# Patient Record
Sex: FEMALE | Race: WHITE | NOT HISPANIC OR LATINO | Employment: FULL TIME | URBAN - METROPOLITAN AREA
[De-identification: names, ages, dates, MRNs, and addresses within clinical notes are randomized per-mention and may not be internally consistent; named-entity substitution may affect disease eponyms.]

---

## 2022-08-11 PROBLEM — N92.0 MENORRHAGIA WITH REGULAR CYCLE: Status: ACTIVE | Noted: 2022-08-11

## 2022-08-11 NOTE — PATIENT INSTRUCTIONS
- Maintain healthy weight with BMI ideally between 18-25     - Eat a healthy diet, including multiple servings of vegetables and fruits, as well as lean protein sources  - Get at least 150 minutes of moderate aerobic activity or 75 minutes of vigorous aerobic activity a week, or a combination of moderate and vigorous activity  Greater amounts of exercise will provide an even greater health benefit  - Ensure diet provides 1200mg of Calcium daily (divided) and 800IU of vitamin D, or take supplements to meet this  - Safe sex practices recommended  - Resources - information on birth control and sexually transmitted infections - www bedsider  org            - information on sexually transmitted infections - www cdc gov/std/

## 2022-08-12 ENCOUNTER — OFFICE VISIT (OUTPATIENT)
Dept: OBGYN CLINIC | Facility: CLINIC | Age: 37
End: 2022-08-12
Payer: COMMERCIAL

## 2022-08-12 VITALS
WEIGHT: 155.8 LBS | DIASTOLIC BLOOD PRESSURE: 80 MMHG | BODY MASS INDEX: 27.61 KG/M2 | SYSTOLIC BLOOD PRESSURE: 101 MMHG | HEIGHT: 63 IN

## 2022-08-12 DIAGNOSIS — Z01.419 ENCOUNTER FOR ANNUAL ROUTINE GYNECOLOGICAL EXAMINATION: Primary | ICD-10-CM

## 2022-08-12 DIAGNOSIS — N92.1 MENORRHAGIA WITH IRREGULAR CYCLE: ICD-10-CM

## 2022-08-12 DIAGNOSIS — Z12.4 CERVICAL CANCER SCREENING: ICD-10-CM

## 2022-08-12 PROCEDURE — 0503F POSTPARTUM CARE VISIT: CPT | Performed by: OBSTETRICS & GYNECOLOGY

## 2022-08-12 PROCEDURE — 99385 PREV VISIT NEW AGE 18-39: CPT | Performed by: OBSTETRICS & GYNECOLOGY

## 2022-08-12 RX ORDER — ALBUTEROL SULFATE 90 UG/1
2 AEROSOL, METERED RESPIRATORY (INHALATION) EVERY 4 HOURS PRN
COMMUNITY
Start: 2022-05-20

## 2022-08-12 RX ORDER — DEXTROAMPHETAMINE SACCHARATE, AMPHETAMINE ASPARTATE, DEXTROAMPHETAMINE SULFATE AND AMPHETAMINE SULFATE 5; 5; 5; 5 MG/1; MG/1; MG/1; MG/1
TABLET ORAL
COMMUNITY
Start: 2022-02-24

## 2022-08-12 NOTE — PROGRESS NOTES
Annual Wellness Visit  [address]   [address], [address], [place], [place]    ASSESSMENT/PLAN: Malachi Calderon is a 39 y o   who presents for annual gynecologic exam     Encounter for routine gynecologic examination  - Routine well woman exam completed today  - Cervical Cancer Screening: Current ASCCP Guidelines reviewed  Last Pap: 2018 normal  Next Pap Due: today, routine   - HPV Vaccination status: Not immunized  - STI screening offered including HIV testing: offered, pt declined  - Contraceptive counseling discussed  Current contraception: abstinence,   - The following were reviewed in today's visit: exercise and healthy diet    Additional problems addressed during this visit:  1  Encounter for annual routine gynecological examination    2  Cervical cancer screening  -     IGP, Aptima HPV, Rfx 16/18,45    3  Menorrhagia with irregular cycle  Assessment & Plan:  Periods long up to 10-12 days and irregular 23-38 days  [facility] is frustrated by length of bleeding  Discussed w/u in 2018 normal uterus, EMB benign  Discussed what is considered normal for periods and although some irregularity - I don't think her periods are pathologically abnormal     Discussed options include Lysteda (which would help heaviness) or po cyclic norethindrone (which would make lighter and more regular)  Offered progesterone IUD or Depo Provera - pt not interested in any injections or implants  Due to migraine with aura she is not a candidate for OCPs  She wishes to try cyclic norethindrone  She is aware this is not contraception  Will try 5mg daily for 21 days a month, can increase to 10mg if BTB or not helpful  Return in 3 months to review  Orders:  -     norethindrone (Aygestin) 5 mg tablet; Take 1 tablet (5 mg total) by mouth daily Take 21 days each month and restart on day 7 of period        Next visit: 1 year Wellness      CC:  Annual Gynecologic Examination    HPI: [facility] Grayce Hodgkin is a 39 y o   who presents for annual gynecologic examination  Elenita Pereira presents for Wellness exam and to discuss periods  She was last in our office in 2018  She denies any breast or urinary issues at today's visit  Pt states that she has bad periods  Pt states that she starts with light spotting for 2-5 days, then she has heavy bleeding 2-3 days, then she will have spotting for another 2-3 days after the heavy bleeding  Pt reports long periods  Pt states that cramping and pain is normal, not significantly worse than normal   About every 23-38 days  Has not been sexually active for years  Gyn History  Patient's last menstrual period was 2022 (exact date)  Last Pap: 2018 was normal    She  reports previously being sexually active and has had partner(s) who are male  She reports using the following method of birth control/protection: Abstinence         OB History      Past Medical History:  No date: ADHD  No date: Asthma  No date: Migraine      Comment:  Migraines w/ aura occasionally     Past Surgical History:  No date:  SECTION      Comment:   and   No date: MYRINGOTOMY  No date: TONSILLECTOMY AND ADENOIDECTOMY     Family History   Problem Relation Age of Onset    Osteoporosis Mother     Rashes / Skin problems Mother         Rosacea    Migraines Father     Rashes / Skin problems Brother         Rosacea    Breast cancer Maternal Grandmother         Maternal grandmother, maternal great grandmother - both postmenopausal    Osteoarthritis Maternal Grandmother     Osteoporosis Maternal Grandmother     Colon cancer Maternal Grandfather     Hypertension Maternal Grandfather     Lung disease Maternal Grandfather         COPD, Emphysema    Migraines Maternal Grandfather     Lung cancer Paternal Grandmother     Bone cancer Paternal Grandfather     Asthma Son     Rashes / Skin problems Son         Eczema    Breast cancer Maternal Aunt postmenopausal        Social History     Tobacco Use    Smoking status: Never Smoker    Smokeless tobacco: Never Used   Vaping Use    Vaping Use: Never used   Substance Use Topics    Alcohol use: Not Currently     Comment: very rarely    Drug use: Never          Current Outpatient Medications:     albuterol (PROVENTIL HFA,VENTOLIN HFA) 90 mcg/act inhaler, Inhale 2 puffs every 4 (four) hours as needed, Disp: , Rfl:     amphetamine-dextroamphetamine (ADDERALL) 20 mg tablet, , Disp: , Rfl:     norethindrone (Aygestin) 5 mg tablet, Take 1 tablet (5 mg total) by mouth daily Take 21 days each month and restart on day 7 of period  , Disp: 30 tablet, Rfl: 3    She is allergic to erythromycin and latex  Leisa Burow ROS negative except as noted in HPI    Objective:  /80 (BP Location: Left arm, Patient Position: Sitting, Cuff Size: Standard)   Ht 5' 3" (1 6 m)   Wt 70 7 kg (155 lb 12 8 oz)   LMP 07/28/2022 (Exact Date)   BMI 27 60 kg/m²      Physical Exam  Constitutional:       Appearance: Normal appearance  Chest:   Breasts:      Right: Normal  No mass, tenderness or axillary adenopathy  Left: Normal  No mass, tenderness or axillary adenopathy  Abdominal:      Palpations: Abdomen is soft  Tenderness: There is no abdominal tenderness  Genitourinary:     General: Normal vulva  Vagina: No bleeding or lesions  Cervix: Normal       Uterus: Normal  Not tender  Adnexa:         Right: No mass or tenderness  Left: No mass or tenderness  Rectum: No external hemorrhoid  Musculoskeletal:         General: Normal range of motion  Lymphadenopathy:      Upper Body:      Right upper body: No axillary adenopathy  Left upper body: No axillary adenopathy  Neurological:      Mental Status: She is alert and oriented to person, place, and time     Psychiatric:         Mood and Affect: Mood normal          Behavior: Behavior normal

## 2022-08-12 NOTE — ASSESSMENT & PLAN NOTE
Periods long up to 10-12 days and irregular 23-38 days  Kristin Olvera is frustrated by length of bleeding  Discussed w/u in 2018 normal uterus, EMB benign  Discussed what is considered normal for periods and although some irregularity - I don't think her periods are pathologically abnormal     Discussed options include Lysteda (which would help heaviness) or po cyclic norethindrone (which would make lighter and more regular)  Offered progesterone IUD or Depo Provera - pt not interested in any injections or implants  Due to migraine with aura she is not a candidate for OCPs  She wishes to try cyclic norethindrone  She is aware this is not contraception  Will try 5mg daily for 21 days a month, can increase to 10mg if BTB or not helpful  Return in 3 months to review

## 2022-08-17 LAB
CYTOLOGIST CVX/VAG CYTO: NORMAL
DX ICD CODE: NORMAL
HPV I/H RISK 4 DNA CVX QL PROBE+SIG AMP: NEGATIVE
OTHER STN SPEC: NORMAL
PATH REPORT.FINAL DX SPEC: NORMAL
SL AMB NOTE:: NORMAL
SL AMB SPECIMEN ADEQUACY: NORMAL
SL AMB TEST METHODOLOGY: NORMAL

## 2022-11-03 NOTE — PROGRESS NOTES
[address]   [address], [address], [facility], [place]    Assessment/Plan:  Blanca Wagner is a 40y o  year old  who presents for follow up medication  1  Menorrhagia with irregular cycle  Assessment & Plan:  Blanca Wagner is happier with her bleeding since starting 5mg aygestin for 21 days each month because it is overall lighter than natural periods  But she is having light bleeding twice a month  Suggested increasing to 10mg for 21 days and see if can decrease the BTB while on pills  She agrees  Gave her paper calendar to keep track if she wants  Gave 12 months - she will call or return if issues  If bleeding continues very irregularly we could repeat pelvic u/s to r/o other causes, given last one was   She agrees  She is aware it is not contraceptoin  Orders:  -     norethindrone (Aygestin) 5 mg tablet; Take 2 tablets (10 mg total) by mouth daily Take 21 days each month and restart on day 7 of period  Subjective:     CC: having break through bleeding    HPI: Viviann Skiff is a 40 y o  who presents for medication follow up     2022 WA pt c/o long periods 10-12 days and some irregularity (q 23-28 days)  Frustrated by length of bleeding  Discussed options  Not sexually active  She wishes to try cyclic norethindrone  She is aware this is not contraception  Will try 5mg daily for 21 days a month, can increase to 10mg if BTB or not helpful  Returns today to follow up  States "I'm having break through bleeding you talked about"  But overall bleeding better than before  Started medication after next period in August - took 5mg daily for 21 days (as prescribed)  - had 5 days light spotting about 1 week after starting that stopped on it's own and then 6 days light spotting when stopped Aygestin  Restarted day 7 of period the same and similar pattern        I tried to get specific dates of taking and bleeding with Blanca Wagner, but she had trouble figuring out exactly by looking at her phone  Generally spotting 2x/ month for 5-6 days - very light only needing mini pad  Overall she is happier with this than prior because it is much less bleeding, although 2x/month  No side effects  The following portions of the patient's history were reviewed and updated as appropriate: allergies, current medications, past family history, past medical history, obstetric history, gynecologic history, past social history, past surgical history and problem list     ROS: Review of Systems   Constitutional: Negative  Gastrointestinal: Negative  Genitourinary: Positive for vaginal bleeding (irregular bleeding lightly)  Negative for pelvic pain and vaginal pain  Psychiatric/Behavioral: Negative  Current Outpatient Medications:   •  albuterol (PROVENTIL HFA,VENTOLIN HFA) 90 mcg/act inhaler, Inhale 2 puffs every 4 (four) hours as needed, Disp: , Rfl:   •  amphetamine-dextroamphetamine (ADDERALL) 20 mg tablet, , Disp: , Rfl:   •  amphetamine-dextroamphetamine (ADDERALL) 7 5 MG tablet, TAKE 1 TABLET BY MOUTH DAILY IN THE AFTERNOON IF NEEDED, Disp: , Rfl:   •  norethindrone (Aygestin) 5 mg tablet, Take 2 tablets (10 mg total) by mouth daily Take 21 days each month and restart on day 7 of period  , Disp: 42 tablet, Rfl: 12    Objective:  /70 (BP Location: Left arm, Patient Position: Sitting, Cuff Size: Standard)   Ht 5' 2 5" (1 588 m)   Wt 70 6 kg (155 lb 9 6 oz)   LMP 10/29/2022 (Exact Date)   Breastfeeding No   BMI 28 01 kg/m²      Physical Exam  Constitutional:       Appearance: Normal appearance  Neurological:      Mental Status: She is alert and oriented to person, place, and time     Psychiatric:         Behavior: Behavior normal

## 2022-11-04 ENCOUNTER — OFFICE VISIT (OUTPATIENT)
Dept: OBGYN CLINIC | Facility: CLINIC | Age: 37
End: 2022-11-04

## 2022-11-04 VITALS
HEIGHT: 63 IN | BODY MASS INDEX: 27.57 KG/M2 | WEIGHT: 155.6 LBS | DIASTOLIC BLOOD PRESSURE: 70 MMHG | SYSTOLIC BLOOD PRESSURE: 108 MMHG

## 2022-11-04 DIAGNOSIS — N92.1 MENORRHAGIA WITH IRREGULAR CYCLE: ICD-10-CM

## 2022-11-04 RX ORDER — DEXTROAMPHETAMINE SACCHARATE, AMPHETAMINE ASPARTATE, DEXTROAMPHETAMINE SULFATE AND AMPHETAMINE SULFATE 1.875; 1.875; 1.875; 1.875 MG/1; MG/1; MG/1; MG/1
TABLET ORAL
COMMUNITY
Start: 2022-10-17

## 2022-11-04 NOTE — ASSESSMENT & PLAN NOTE
Nick Reyes is happier with her bleeding since starting 5mg aygestin for 21 days each month because it is overall lighter than natural periods  But she is having light bleeding twice a month  Suggested increasing to 10mg for 21 days and see if can decrease the BTB while on pills  She agrees  Gave her paper calendar to keep track if she wants  Gave 12 months - she will call or return if issues  If bleeding continues very irregularly we could repeat pelvic u/s to r/o other causes, given last one was 2018  She agrees  She is aware it is not contraceptoin

## 2023-10-25 DIAGNOSIS — N92.1 MENORRHAGIA WITH IRREGULAR CYCLE: ICD-10-CM

## 2024-01-17 DIAGNOSIS — N92.1 MENORRHAGIA WITH IRREGULAR CYCLE: ICD-10-CM

## 2024-02-01 NOTE — PATIENT INSTRUCTIONS
- Maintain healthy weight with BMI ideally between 18-25.    - Eat a healthy diet, including multiple servings of vegetables and fruits, as well as lean protein sources.  - Get at least 150 minutes of moderate aerobic activity or 75 minutes of vigorous aerobic activity a week, or a combination of moderate and vigorous activity.  Greater amounts of exercise will provide an even greater health benefit.   - Ensure diet provides 1200mg of Calcium daily (divided) and 800IU of vitamin D, or take supplements to meet this.  - Safe sex practices recommended.    - Resources - information on birth control and sexually transmitted infections - www.bedsider.org            - information on sexually transmitted infections - www.cdc.gov/std/     Kegel Exercises for Women   AMBULATORY CARE:   Kegel exercises  help strengthen your pelvic muscles. Pelvic muscles hold your pelvic organs, such as your bladder and uterus, in place. Kegel exercises help prevent or control certain conditions, such as urine incontinence (leakage) or uterine prolapse.       Call your doctor or physical therapist if:   You cannot feel your muscles tighten or relax.    You continue to leak urine.    You have questions or concerns about your condition or care.    Use the correct muscles:  Pelvic muscles are the muscles you use to control urine flow. To target these muscles, stop and start the flow of urine several times. This will help you become familiar with how it feels to tighten and relax these muscles.  How to do Kegel exercises:   Get into a comfortable position.  You may lie down, stand up, or sit down to do these exercises. When you first try to do these exercises, it may be easier if you lie down.    Tighten or squeeze your pelvic muscles slowly.  It may feel like you are trying to hold back urine or gas. Hold this position for 3 seconds. Relax for 3 seconds. Repeat this cycle 10 times. Do not hold your breath when you do Kegel exercises. Keep your  stomach, back, and leg muscles relaxed.    Do 10 sets of Kegel exercises, at least 3 times a day.  When you know how to do Kegel exercises, use different positions. This will help to strengthen your pelvic muscles as much as possible. You can do these exercises while you lie on the floor, watch TV, or while you stand. Tighten your pelvic muscles before you sneeze, cough, or lift to prevent urine leakage. You may notice improved bladder control within about 6 weeks.    Follow up with your doctor or physical therapist as directed:  Write down your questions so you remember to ask them during your visits.  © Copyright Mer[employer] 2023 Information is for End User's use only and may not be sold, redistributed or otherwise used for commercial purposes.  The above information is an  only. It is not intended as medical advice for individual conditions or treatments. Talk to your doctor, nurse or pharmacist before following any medical regimen to see if it is safe and effective for you.

## 2024-02-02 ENCOUNTER — ANNUAL EXAM (OUTPATIENT)
Dept: OBGYN CLINIC | Facility: CLINIC | Age: 39
End: 2024-02-02
Payer: COMMERCIAL

## 2024-02-02 VITALS
DIASTOLIC BLOOD PRESSURE: 70 MMHG | SYSTOLIC BLOOD PRESSURE: 102 MMHG | BODY MASS INDEX: 30.37 KG/M2 | HEIGHT: 63 IN | WEIGHT: 171.4 LBS

## 2024-02-02 DIAGNOSIS — Z01.419 ENCOUNTER FOR ANNUAL ROUTINE GYNECOLOGICAL EXAMINATION: Primary | ICD-10-CM

## 2024-02-02 DIAGNOSIS — N39.3 SUI (STRESS URINARY INCONTINENCE, FEMALE): ICD-10-CM

## 2024-02-02 DIAGNOSIS — N92.1 MENORRHAGIA WITH IRREGULAR CYCLE: ICD-10-CM

## 2024-02-02 PROCEDURE — 99395 PREV VISIT EST AGE 18-39: CPT | Performed by: OBSTETRICS & GYNECOLOGY

## 2024-02-02 RX ORDER — MULTIVITAMIN
1 TABLET ORAL DAILY
COMMUNITY

## 2024-02-02 NOTE — ASSESSMENT & PLAN NOTE
Discussed causes of stress unrinary incontinence w/ patient.  Reviewed weight loss, Kegel and core exercises to increase strength of pelvic floor, as well as frequent voiding or decreasing fluid intake to decrease risk of leaking.  Reviewed options of physical therapy and surgical correction.  Gave handout with AVS on Kegel exercises.

## 2024-02-02 NOTE — ASSESSMENT & PLAN NOTE
Taking monthly for 21 days with light monthly withdrawal bleeds.  Not sexually active.  Very happy.  To continue.  Patient reminded this is NOT contraception.

## 2024-02-02 NOTE — LETTER
2024     YASMINE Brown  [address]  [address]  [place] [address]    Patient: Betty Cherry   YOB: 1985   Date of Visit: 2024       Dear Dr. Childs:    Thank you for referring Betty Cherry to me for evaluation. Below are my notes for this consultation.    If you have questions, please do not hesitate to call me. I look forward to following your patient along with you.         Sincerely,        Roberta Gutierrez MD        CC: No Recipients    Roberta Gutierrez MD  2024  8:41 AM  Sign when Signing Visit  Annual Wellness Visit  [facility] - [address], [address], [place] [address]    ASSESSMENT/PLAN: Betty Cherry is a 38 y.o.  who presents for annual gynecologic exam.    Encounter for routine gynecologic examination  - Routine well woman exam completed today.  - Cervical Cancer Screening: Current ASCCP Guidelines reviewed. Last Pap: 2022 normal. Next Pap Due: 2 years, routine.  - HPV Vaccination status: Not immunized  - STI screening offered including HIV testing: offered, pt declined  - Contraceptive counseling discussed.  Current contraception: abstinence,   - The following were reviewed in today's visit: family planning choices, exercise, and healthy diet    Additional problems addressed during this visit:  1. Encounter for annual routine gynecological examination    2. Menorrhagia with irregular cycle  Assessment & Plan:  Taking monthly for 21 days with light monthly withdrawal bleeds.  Not sexually active.  Very happy.  To continue.  Patient reminded this is NOT contraception.    Orders:  -     norethindrone (AYGESTIN) 5 mg tablet; Take 10 mg daily for 21 days, then stop for 7 days.  Restart.    3. GREGORY (stress urinary incontinence, female)  Assessment & Plan:  Discussed causes of stress unrinary incontinence w/ patient.  Reviewed weight loss, Kegel and core exercises to increase strength of pelvic floor, as  well as frequent voiding or decreasing fluid intake to decrease risk of leaking.  Reviewed options of physical therapy and surgical correction.  Gave handout with AVS on Kegel exercises.          Next visit: 1 year Wellness      CC:  Annual Gynecologic Examination    HPI: Betty Cherry is a 38 y.o.  who presents for annual gynecologic examination.  She denies any breast, urinary or pelvic issues at today's visit.    Taking Aygestin to regulate and make lighter. Takes 10mg AM daily for 21 days, stops 7 days.    Getting mostly monthly periods at day or two after stopping Aygestin.  Occasionally skips one and notes it's at high stress times.  Not sexually active.    GREGORY noted with cough and sneeze        Gyn History  Patient's last menstrual period was 2024 (exact date).     Last Pap: 2022 was normal    She  reports that she is not currently sexually active and has had partner(s) who are male. She reports using the following method of birth control/protection: Abstinence.       OB History      Past Medical History:  No date: ADHD  No date: Asthma  No date: Migraine      Comment:  Migraines w/ aura occasionally  No date: GREGORY (stress urinary incontinence, female)      Comment:  mild - noted      Past Surgical History:  No date:  SECTION      Comment:   and 2023: DENTAL IMPLANT  No date: MYRINGOTOMY  No date: TONSILLECTOMY AND ADENOIDECTOMY     Family History   Problem Relation Age of Onset   • Osteoporosis Mother    • Rashes / Skin problems Mother         Rosacea   • Migraines Father    • Rashes / Skin problems Brother         Rosacea   • Asthma Son    • Rashes / Skin problems Son         Eczema   • Breast cancer Maternal Grandmother         Maternal grandmother, maternal great grandmother - both postmenopausal   • Osteoarthritis Maternal Grandmother    • Osteoporosis Maternal Grandmother    • Colon cancer Maternal Grandfather    • Hypertension Maternal Grandfather    •  "Lung disease Maternal Grandfather         COPD, Emphysema   • Migraines Maternal Grandfather    • Lung cancer Paternal Grandmother    • Bone cancer Paternal Grandfather    • Breast cancer Maternal Aunt         postmenopausal   • Breast cancer Cousin         Social History     Tobacco Use   • Smoking status: Never   • Smokeless tobacco: Never   Vaping Use   • Vaping status: Never Used   Substance Use Topics   • Alcohol use: Not Currently     Comment: very rarely   • Drug use: Never          Current Outpatient Medications:   •  albuterol (PROVENTIL HFA,VENTOLIN HFA) 90 mcg/act inhaler, Inhale 2 puffs every 4 (four) hours as needed, Disp: , Rfl:   •  amphetamine-dextroamphetamine (ADDERALL) 20 mg tablet, , Disp: , Rfl:   •  amphetamine-dextroamphetamine (ADDERALL) 7.5 MG tablet, TAKE 1 TABLET BY MOUTH DAILY IN THE AFTERNOON IF NEEDED, Disp: , Rfl:   •  Multiple Vitamin (multivitamin) tablet, Take 1 tablet by mouth daily, Disp: , Rfl:   •  norethindrone (AYGESTIN) 5 mg tablet, Take 10 mg daily for 21 days, then stop for 7 days.  Restart., Disp: 42 tablet, Rfl: 12    She is allergic to erythromycin and latex..    ROS negative except as noted in HPI    Objective:  /70   Ht 5' 2.5\" (1.588 m)   Wt 77.7 kg (171 lb 6.4 oz)   LMP 01/05/2024 (Exact Date)   Breastfeeding No   BMI 30.85 kg/m²      Physical Exam  Constitutional:       Appearance: Normal appearance.   Chest:   Breasts:     Right: Normal. No mass or tenderness.      Left: Normal. No mass or tenderness.   Abdominal:      Palpations: Abdomen is soft.      Tenderness: There is no abdominal tenderness.   Genitourinary:     General: Normal vulva.      Vagina: No bleeding or lesions.      Cervix: Normal.      Uterus: Normal. Not tender.       Adnexa:         Right: No mass or tenderness.          Left: No mass or tenderness.        Rectum: No external hemorrhoid.   Musculoskeletal:         General: Normal range of motion.   Lymphadenopathy:      Upper Body:     "  Right upper body: No axillary adenopathy.      Left upper body: No axillary adenopathy.   Neurological:      Mental Status: She is alert and oriented to person, place, and time.   Psychiatric:         Mood and Affect: Mood normal.         Behavior: Behavior normal.

## 2024-02-02 NOTE — PROGRESS NOTES
Annual Wellness Visit  [facility] - [address], [address], [place] [address]    ASSESSMENT/PLAN: Betty Cherry is a 38 y.o.  who presents for annual gynecologic exam.    Encounter for routine gynecologic examination  - Routine well woman exam completed today.  - Cervical Cancer Screening: Current ASCCP Guidelines reviewed. Last Pap: 2022 normal. Next Pap Due: 2 years, routine.  - HPV Vaccination status: Not immunized  - STI screening offered including HIV testing: offered, pt declined  - Contraceptive counseling discussed.  Current contraception: abstinence,   - The following were reviewed in today's visit: family planning choices, exercise, and healthy diet    Additional problems addressed during this visit:  1. Encounter for annual routine gynecological examination    2. Menorrhagia with irregular cycle  Assessment & Plan:  Taking monthly for 21 days with light monthly withdrawal bleeds.  Not sexually active.  Very happy.  To continue.  Patient reminded this is NOT contraception.    Orders:  -     norethindrone (AYGESTIN) 5 mg tablet; Take 10 mg daily for 21 days, then stop for 7 days.  Restart.    3. GREGORY (stress urinary incontinence, female)  Assessment & Plan:  Discussed causes of stress unrinary incontinence w/ patient.  Reviewed weight loss, Kegel and core exercises to increase strength of pelvic floor, as well as frequent voiding or decreasing fluid intake to decrease risk of leaking.  Reviewed options of physical therapy and surgical correction.  Gave handout with AVS on Kegel exercises.          Next visit: 1 year Wellness      CC:  Annual Gynecologic Examination    HPI: Betty Cherry is a 38 y.o.  who presents for annual gynecologic examination.  She denies any breast, urinary or pelvic issues at today's visit.    Taking Aygestin to regulate and make lighter. Takes 10mg AM daily for 21 days, stops 7 days.    Getting mostly monthly periods at day or two  after stopping Aygestin.  Occasionally skips one and notes it's at high stress times.  Not sexually active.    GREGORY noted with cough and sneeze        Gyn History  Patient's last menstrual period was 2024 (exact date).     Last Pap: 2022 was normal    She  reports that she is not currently sexually active and has had partner(s) who are male. She reports using the following method of birth control/protection: Abstinence.       OB History      Past Medical History:  No date: ADHD  No date: Asthma  No date: Migraine      Comment:  Migraines w/ aura occasionally  No date: GREGORY (stress urinary incontinence, female)      Comment:  mild - noted      Past Surgical History:  No date:  SECTION      Comment:   and 2023: DENTAL IMPLANT  No date: MYRINGOTOMY  No date: TONSILLECTOMY AND ADENOIDECTOMY     Family History   Problem Relation Age of Onset    Osteoporosis Mother     Rashes / Skin problems Mother         Rosacea    Migraines Father     Rashes / Skin problems Brother         Rosacea    Asthma Son     Rashes / Skin problems Son         Eczema    Breast cancer Maternal Grandmother         Maternal grandmother, maternal great grandmother - both postmenopausal    Osteoarthritis Maternal Grandmother     Osteoporosis Maternal Grandmother     Colon cancer Maternal Grandfather     Hypertension Maternal Grandfather     Lung disease Maternal Grandfather         COPD, Emphysema    Migraines Maternal Grandfather     Lung cancer Paternal Grandmother     Bone cancer Paternal Grandfather     Breast cancer Maternal Aunt         postmenopausal    Breast cancer Cousin         Social History     Tobacco Use    Smoking status: Never    Smokeless tobacco: Never   Vaping Use    Vaping status: Never Used   Substance Use Topics    Alcohol use: Not Currently     Comment: very rarely    Drug use: Never          Current Outpatient Medications:     albuterol (PROVENTIL HFA,VENTOLIN HFA) 90 mcg/act inhaler,  "Inhale 2 puffs every 4 (four) hours as needed, Disp: , Rfl:     amphetamine-dextroamphetamine (ADDERALL) 20 mg tablet, , Disp: , Rfl:     amphetamine-dextroamphetamine (ADDERALL) 7.5 MG tablet, TAKE 1 TABLET BY MOUTH DAILY IN THE AFTERNOON IF NEEDED, Disp: , Rfl:     Multiple Vitamin (multivitamin) tablet, Take 1 tablet by mouth daily, Disp: , Rfl:     norethindrone (AYGESTIN) 5 mg tablet, Take 10 mg daily for 21 days, then stop for 7 days.  Restart., Disp: 42 tablet, Rfl: 12    She is allergic to erythromycin and latex..    ROS negative except as noted in HPI    Objective:  /70   Ht 5' 2.5\" (1.588 m)   Wt 77.7 kg (171 lb 6.4 oz)   LMP 01/05/2024 (Exact Date)   Breastfeeding No   BMI 30.85 kg/m²      Physical Exam  Constitutional:       Appearance: Normal appearance.   Chest:   Breasts:     Right: Normal. No mass or tenderness.      Left: Normal. No mass or tenderness.   Abdominal:      Palpations: Abdomen is soft.      Tenderness: There is no abdominal tenderness.   Genitourinary:     General: Normal vulva.      Vagina: No bleeding or lesions.      Cervix: Normal.      Uterus: Normal. Not tender.       Adnexa:         Right: No mass or tenderness.          Left: No mass or tenderness.        Rectum: No external hemorrhoid.   Musculoskeletal:         General: Normal range of motion.   Lymphadenopathy:      Upper Body:      Right upper body: No axillary adenopathy.      Left upper body: No axillary adenopathy.   Neurological:      Mental Status: She is alert and oriented to person, place, and time.   Psychiatric:         Mood and Affect: Mood normal.         Behavior: Behavior normal.         "

## 2025-02-15 DIAGNOSIS — N92.1 MENORRHAGIA WITH IRREGULAR CYCLE: ICD-10-CM

## 2025-02-18 RX ORDER — NORETHINDRONE 5 MG/1
TABLET ORAL
Qty: 42 TABLET | Refills: 1 | Status: SHIPPED | OUTPATIENT
Start: 2025-02-18

## 2025-03-06 ENCOUNTER — ANNUAL EXAM (OUTPATIENT)
Dept: OBGYN CLINIC | Facility: CLINIC | Age: 40
End: 2025-03-06
Payer: COMMERCIAL

## 2025-03-06 VITALS
HEIGHT: 63 IN | BODY MASS INDEX: 32.96 KG/M2 | DIASTOLIC BLOOD PRESSURE: 76 MMHG | SYSTOLIC BLOOD PRESSURE: 118 MMHG | WEIGHT: 186 LBS

## 2025-03-06 DIAGNOSIS — N92.1 MENORRHAGIA WITH IRREGULAR CYCLE: ICD-10-CM

## 2025-03-06 DIAGNOSIS — Z12.31 ENCOUNTER FOR SCREENING MAMMOGRAM FOR MALIGNANT NEOPLASM OF BREAST: ICD-10-CM

## 2025-03-06 DIAGNOSIS — Z01.419 ENCOUNTER FOR ANNUAL ROUTINE GYNECOLOGICAL EXAMINATION: Primary | ICD-10-CM

## 2025-03-06 PROCEDURE — 99395 PREV VISIT EST AGE 18-39: CPT | Performed by: OBSTETRICS & GYNECOLOGY

## 2025-03-06 RX ORDER — NORETHINDRONE 5 MG/1
TABLET ORAL
Qty: 42 TABLET | Refills: 13 | Status: SHIPPED | OUTPATIENT
Start: 2025-03-06

## 2025-03-06 NOTE — PROGRESS NOTES
Annual Wellness Visit  [facility] - [address], [address], [place] [address]    ASSESSMENT/PLAN: Betty Cherry is a 39 y.o.  who presents for annual gynecologic exam.    Assessment & Plan  Encounter for annual routine gynecological examination  - Routine well woman exam completed today.  - Cervical Cancer Screening: Current ASCCP Guidelines reviewed. Last Pap: 2022 normal.  Past abnormal pap: none.  Next Pap Due: 1-2 years.  - HPV Vaccination status: Not immunized  - STI screening offered including HIV testing: offered, pt declined  - Contraceptive counseling discussed.  Current contraception: abstinence,   - The following were reviewed in today's visit: mammography screening ordered, exercise, and healthy diet       Encounter for screening mammogram for malignant neoplasm of breast  Start at age 39yo.  Orders:    Mammo screening bilateral w 3d and cad; Future    Menorrhagia with irregular cycle  Cyclic Aygestin effective in controlling heavy and irregular periods.  Continue at 10mg daily for 21 days each month.  Refill.  Reminded this is NOT contraception.    Orders:    norethindrone (AYGESTIN) 5 mg tablet; TAKE 2 TABLETS BY MOUTH EVERY DAY FOR 21 DAYS , THEN STOP FOR 7 DAYS. RESTART.      Next visit: 1 year Wellness      CC:  Annual Gynecologic Examination    HPI: Betty Cehrry is a 39 y.o.  who presents for annual gynecologic examination.  She denies any breast, urinary or pelvic issues at today's visit.    Monthly bleeding - 1-2 days after stopping Aygestin.  Taking Aygestin to regulate and make periods lighter. Takes 10mg AM daily for 21 days, stops 7 days.    Occasionally skips withdrawal bleed but notes it's at high stress times.  Not sexually active.          Gyn History  Patient's last menstrual period was 2025 (exact date).     Last Pap: 2022 was normal    She  reports that she is not currently sexually active and has had partner(s) who are  male. She reports using the following method of birth control/protection: Abstinence.       OB History      Past Medical History:  No date: ADHD  No date: Asthma  No date: Migraine      Comment:  Migraines w/ aura occasionally  No date: GREGORY (stress urinary incontinence, female)      Comment:  mild - noted   No date: Varicella      Comment:  As a child     Past Surgical History:  No date:  SECTION      Comment:   and 2023: DENTAL IMPLANT  No date: MYRINGOTOMY  No date: TONSILLECTOMY AND ADENOIDECTOMY     Family History   Problem Relation Age of Onset    Osteoporosis Mother     Rashes / Skin problems Mother         Rosacea    Migraines Father     Rashes / Skin problems Brother         Rosacea    Asthma Son     Rashes / Skin problems Son         Eczema    Breast cancer Maternal Grandmother         Maternal grandmother, maternal great grandmother, maternal aunt    Osteoarthritis Maternal Grandmother     Osteoporosis Maternal Grandmother     Colon cancer Maternal Grandfather     Hypertension Maternal Grandfather     Lung disease Maternal Grandfather         COPD, Emphysema    Migraines Maternal Grandfather     Lung cancer Paternal Grandmother     Cancer Paternal Grandmother         Lung cancer    Bone cancer Paternal Grandfather     Cancer Paternal Grandfather         Bone cancer    Breast cancer Maternal Aunt         postmenopausal    Breast cancer Cousin     Asthma Son     Rashes / Skin problems Son         Eczema    Rashes / Skin problems Brother         Rosacea        Social History     Tobacco Use    Smoking status: Never    Smokeless tobacco: Never   Vaping Use    Vaping status: Never Used   Substance Use Topics    Alcohol use: Not Currently     Comment: 1x/year    Drug use: Never          Current Outpatient Medications:     albuterol (PROVENTIL HFA,VENTOLIN HFA) 90 mcg/act inhaler, Inhale 2 puffs every 4 (four) hours as needed, Disp: , Rfl:     amphetamine-dextroamphetamine (ADDERALL)  "20 mg tablet, , Disp: , Rfl:     Multiple Vitamin (multivitamin) tablet, Take 1 tablet by mouth daily, Disp: , Rfl:     norethindrone (AYGESTIN) 5 mg tablet, TAKE 2 TABLETS BY MOUTH EVERY DAY FOR 21 DAYS , THEN STOP FOR 7 DAYS. RESTART., Disp: 42 tablet, Rfl: 13    She is allergic to erythromycin and latex..    ROS negative except as noted in HPI    Objective:  /76 (BP Location: Left arm, Patient Position: Sitting, Cuff Size: Standard)   Ht 5' 2.5\" (1.588 m)   Wt 84.4 kg (186 lb)   LMP 02/25/2025 (Exact Date)   BMI 33.48 kg/m²      Physical Exam  Constitutional:       Appearance: Normal appearance.   Chest:   Breasts:     Right: Normal. No mass or tenderness.      Left: Normal. No mass or tenderness.   Abdominal:      Palpations: Abdomen is soft.      Tenderness: There is no abdominal tenderness.   Genitourinary:     General: Normal vulva.      Vagina: No bleeding or lesions.      Cervix: Normal.      Uterus: Normal. Not tender.       Adnexa:         Right: No mass or tenderness.          Left: No mass or tenderness.        Rectum: No external hemorrhoid.   Musculoskeletal:         General: Normal range of motion.   Lymphadenopathy:      Upper Body:      Right upper body: No axillary adenopathy.      Left upper body: No axillary adenopathy.   Neurological:      Mental Status: She is alert and oriented to person, place, and time.   Psychiatric:         Mood and Affect: Mood normal.         Behavior: Behavior normal.         "

## 2025-03-06 NOTE — ASSESSMENT & PLAN NOTE
Cyclic Aygestin effective in controlling heavy and irregular periods.  Continue at 10mg daily for 21 days each month.  Refill.  Reminded this is NOT contraception.    Orders:    norethindrone (AYGESTIN) 5 mg tablet; TAKE 2 TABLETS BY MOUTH EVERY DAY FOR 21 DAYS , THEN STOP FOR 7 DAYS. RESTART.

## 2025-08-07 DIAGNOSIS — F90.9 ATTENTION DEFICIT HYPERACTIVITY DISORDER (ADHD), UNSPECIFIED ADHD TYPE: Primary | ICD-10-CM

## 2025-08-07 RX ORDER — DEXTROAMPHETAMINE SACCHARATE, AMPHETAMINE ASPARTATE, DEXTROAMPHETAMINE SULFATE AND AMPHETAMINE SULFATE 2.5; 2.5; 2.5; 2.5 MG/1; MG/1; MG/1; MG/1
10 TABLET ORAL DAILY PRN
Qty: 30 TABLET | Refills: 0 | Status: SHIPPED | OUTPATIENT
Start: 2025-08-07

## 2025-08-12 PROBLEM — F90.0 ATTENTION DEFICIT HYPERACTIVITY DISORDER, PREDOMINANTLY INATTENTIVE TYPE: Status: ACTIVE | Noted: 2025-08-12

## 2025-08-12 PROBLEM — K58.2 MIXED IRRITABLE BOWEL SYNDROME: Status: ACTIVE | Noted: 2025-08-12

## 2025-08-12 PROBLEM — F41.9 ANXIETY DISORDER, UNSPECIFIED: Status: ACTIVE | Noted: 2025-08-12

## 2025-08-12 PROBLEM — F33.42 MAJOR DEPRESSIVE DISORDER, RECURRENT, IN FULL REMISSION (HCC): Status: ACTIVE | Noted: 2025-08-12

## 2025-08-12 PROBLEM — J45.20 MILD INTERMITTENT ASTHMA, UNCOMPLICATED: Status: ACTIVE | Noted: 2025-08-12

## 2025-08-12 PROBLEM — K08.9 DISORDER OF TEETH AND SUPPORTING STRUCTURES, UNSPECIFIED: Status: ACTIVE | Noted: 2025-08-12

## 2025-08-12 PROBLEM — G43.109 MIGRAINE WITH AURA, NOT INTRACTABLE, WITHOUT STATUS MIGRAINOSUS: Status: ACTIVE | Noted: 2025-08-12

## 2025-08-12 PROBLEM — E66.09 OTHER OBESITY DUE TO EXCESS CALORIES: Status: ACTIVE | Noted: 2025-08-12

## 2025-08-17 DIAGNOSIS — F90.0 ATTENTION DEFICIT HYPERACTIVITY DISORDER, PREDOMINANTLY INATTENTIVE TYPE: Primary | ICD-10-CM

## 2025-08-17 RX ORDER — DEXTROAMPHETAMINE SACCHARATE, AMPHETAMINE ASPARTATE, DEXTROAMPHETAMINE SULFATE AND AMPHETAMINE SULFATE 5; 5; 5; 5 MG/1; MG/1; MG/1; MG/1
20 TABLET ORAL DAILY
Qty: 30 TABLET | Refills: 0 | Status: SHIPPED | OUTPATIENT
Start: 2025-08-17